# Patient Record
(demographics unavailable — no encounter records)

---

## 2024-10-11 NOTE — PROCEDURE
[FreeTextEntry1] : PFTs revealed normal flows; FEV1 was 2.72 L, which is 118% of predicted; normal flow volume loop. PFTs were performed to evaluate for asthma   FENO was 25; a normal value being less than 25 Fractional exhaled nitric oxide (FENO) is regarded as a simple, noninvasive method for assessing eosinophilic airway inflammation. Produced by a variety of cells within the lung, nitric oxide (NO) concentrations are generally low in healthy individuals. However, high concentrations of NO appear to be involved in nonspecific host defense mechanisms and chronic inflammatory diseases such as asthma. The American Thoracic Society (ATS) therefore has recommended using FENO to aid in the diagnosis and monitoring of eosinophilic airway inflammation and asthma, and for identifying steroid responsive individuals whose chronic respiratory symptoms may be caused by airway inflammation.

## 2024-10-11 NOTE — ADDENDUM
[FreeTextEntry1] : Documented by Raymundo Carmona acting as a scribe for Dr. Aris Bills on 10/11/2024 All medical record entries made by the Scribe were at my, Dr. Aris Bills's, direction and personally dictated by me on 10/11/2024 . I have reviewed the chart and agree that the record accurately reflects my personal performance of the history, physical exam, assessment and plan. I have also personally directed, reviewed, and agree with the discharge instructions.

## 2024-10-11 NOTE — REASON FOR VISIT
[Follow-Up] : a follow-up visit [TextBox_44] : abnormal CT, asthma, allergy, GERD, + severe MIGUEL A

## 2024-10-11 NOTE — PHYSICAL EXAM
[No Acute Distress] : no acute distress [Normal Oropharynx] : normal oropharynx [II] : Mallampati Class: II [Normal Appearance] : normal appearance [No Neck Mass] : no neck mass [Normal Rate/Rhythm] : normal rate/rhythm [Normal S1, S2] : normal s1, s2 [No Murmurs] : no murmurs [No Resp Distress] : no resp distress [No Abnormalities] : no abnormalities [Benign] : benign [Normal Gait] : normal gait [No Clubbing] : no clubbing [No Cyanosis] : no cyanosis [No Edema] : no edema [FROM] : FROM [Normal Color/ Pigmentation] : normal color/ pigmentation [No Focal Deficits] : no focal deficits [Oriented x3] : oriented x3 [Normal Affect] : normal affect [TextBox_11] : irritated right nostril  [TextBox_68] : I:E ratio 1:3; mild expiratory wheeze

## 2024-10-11 NOTE — HISTORY OF PRESENT ILLNESS
[TextBox_4] : Ms. CASTILLO is a 62 year old female presenting to the office today for a follow-up pulmonary evaluation. Her chief complaint is   -she notes chest tightness/pressure due to mold in leaves (seasonal) - asthmatic Sx -she notes diarrhea -she denies constipation - uses Seed probiotic  -she notes COVID-19 5/2024 -she denies treating MIGUEL A -she notes using Trelegy daily -she denies heartburn/reflux -she notes gaining weight -she denies elevated heart rate    -she denies any headaches, nausea, emesis, fever, chills, sweats, chest pain, chest pressure, coughing, wheezing, palpitations, diarrhea, constipation, dysphagia, vertigo, arthralgias, myalgias, leg swelling, itchy eyes, itchy ears, heartburn, reflux, or sour taste in the mouth.

## 2024-10-11 NOTE — ASSESSMENT
[FreeTextEntry1] : Ms. CASTILLO is a 62 year old female with a history of hypothyroid, iron deficiency / anemia, overweight, asthma, allergies, GERD, mood disorder, abnormal CT, OSAS, osteopenia, SVT 11/202, allergies, NC w/ OSAS rx - pending rx (still) - pending, weight gain, allergy/asthma active - mild Allergic issues- s/p with active asthma s/p COVID 19 - 5/2024 - active asthma / nasal irritation  The patient's shortness of breath is multifactorial due to: -pulmonary disease  -asthma -poor breathing mechanics -overweight/out of shape -anemia -(+) cardiac disease  Problem 1: asthma (risks: allergy, GERD) - (active) 2/p COVID 19- 5/2024 -continue Trelegy 100 1 inhalation QD --> move to 200  -continue Singulair 10 mg before bed (HOLD) -continue Ventolin 2 puffs Q6H, pre-exercise -add Airsupra 2 inhalations Q6H PRN -add Xopenex 0.63 via nebulizer q6H prn  -s/p Prednisone 30 mg x 5 days, 20 mg x 5 days, 10 mg x 5 days 1/2024 -Information sheet given to the patient to be reviewed, this medication is never to be used without consulting the prescribing physician. Proper dietary restraint is necessary specifically salt containing foods, if any reaction may occur should be reported.  -Asthma is believed to be caused by inherited (genetic) and environmental factor, but its exact cause is unknown. Asthma may be triggered by allergens, lung infections, or irritants in the air. Asthma triggers are different for each person -Inhaler technique reviewed as well as oral hygiene techniques reviewed with patient. Avoidance of cold air, extremes of temperature, rescue inhaler should be used before exercise. Order of medication reviewed with patient. Recommended use of a cool mist humidifier in the bedroom.  Problem 1a: SVT- x1 11/2021 -recommended f/u evaluation with Dr. Gino Jorgensen / Dr. Turner [facility] - workup completed  Problem 1B: Acute Bronchitis- 1/2024 (resolved) -Zithromax 500 mg x 7 days  -complete blood work to include: mycoplasma, chlamydia pneumoniae, and pertussis titers  -add gabapentin 100 mg Q8H   Problem 2: Allergy / Sinus (active) -continue Olopatadine 0.2% eye drops, one drop in each eye BID -add Olopatadine 0.6% 1 sniff BID or equivalent -continue Allegra 180 qAM -add Clarinex 5 mg QHS or Xyzal 5 mg QHS -continue Flonase 1 sniff each nostril BID, PRN -s/p blood work to include: IgE level, eosinophil level, (low) vitamin D level, food IgE level, and asthma profile Environmental measures for allergies were encouraged including mattress and pillow cover, air purifier, and environmental controls.  Problem 2A: Nasal Irritation -recommend Mupirocin ointment BID  Problem 2B: Low Vitamin D -on Rx Has been associated with asthma exacerbations and increased allergic symptoms. The goal based on recent information is maintaining levels between 50-70 and low normal is 30. Recommended 50,000 units every two weeks to once a month depending on the level.  Problem 3: GERD -continue Omeprazole 40 mg before breakfast -Rule of 2s: avoid eating too much, eating too fast, eating too late, eating too spicy, eating too lousy, eating two hours before bed. -Things to avoid including overeating, spicy foods, tight clothing, eating within three hours of bed, this list is not all inclusive. -For treatment of reflux, possible options discussed including diet control, H2 blockers, PPIs, as well as coating motility agents discussed as treatment options. Timing of meals and proximity of last meal to sleep were discussed. If symptoms persist, a formal gastrointestinal evaluation is needed.  Problem 4: Anemia (McNelis) -on supplemental iron -s/p Iron infusions  Problem 5: (+) MIGUEL A (severe) - ?correct degree (EDS, GERD, poor sleep, and snoring) -recommend "Snore Tape" -consider Kim Pro -complete in lab split night study based on SVT (11/2021) - resolved SVT -complete a home sleep study; APAP study (NC) -redo home sleep study (Rx 01/26/2024) -recommended Dental Device - (Scripted) / Danoff Sleep apnea is associated with adverse clinical consequences which an affect most organ systems. Cardiovascular disease risk includes arrhythmias, atrial fibrillation, hypertension, coronary artery disease, and stroke. Metabolic disorders include diabetes type 2, non-alcoholic fatty liver disease. Mood disorder especially depression; and cognitive decline especially in the elderly. Associations with chronic reflux/Bhat's esophagus some but not all inclusive. -Reasons include arousal consistent with hypopnea; respiratory events most prominent in REM sleep or supine position; therefore sleep staging and body position are important for accurate diagnosis and estimation of AHI.  Problem 6: Abnormal CT - nodule - c/w prior inflammatory disease -s/p blood work to include: Quantiferon Gold -no need for follow up CT at the current time  CAT scans are the only radiological modality to identify abnormalities w/in the lungs with regards to nodules/masses/lymph nodes. Risks, benefits were reviewed in detail. The guidelines for abnormalities include follow up CT scans at various intervals which could range from 6 weeks to 1 year intervals. If there is a change for the worse then consideration for a biopsy will be considered if you are a candidate. Second opinion evaluation with a thoracic surgeon or an interventional radiologist could be offered.  Problem 7: Poor Mechanics of Breathing -Recommended Wigee Hof and Buteyko breathing techniques - Proper breathing techniques were reviewed with an emphasis of exhalation. Patient instructed to breath in for 1 second and out for four seconds. Patient was encouraged to not talk while walking.  Problem 8: Overweight -recommended Berberine OTC supplement for visceral fat loss  - Recommended "10-Day Detox" by Avinash Anderson for 2-3 weeks followed by probiotics -Recommend "Muniq" OTC Supplement for weight loss, energy levels, and blood sugar levels -complete Functional Medicine Evaluation with Dr. Su and Dr. Carpio -Weight loss, exercise, and diet control were discussed and are highly encouraged. Treatment options were given such as, aqua therapy, and contacting a nutritionist. Recommended to use the elliptical, stationary bike, less use of treadmill. Mindful eating was explained to the patient Obesity is associated with worsening asthma, shortness of breath, and potential for cardiac disease, diabetes, and other underlying medical conditions.  Problem 9: Cardiac (SVT) 11/2021 -recommended to follow up with a cardiologist (Jameel / Johnny - work up non Dx  Problem 10: health maintenance -recommended RSV vaccine in the Fall for anyone over the age of 60 -s/p COVID 19 vaccine x 4 -s/p influenza vaccine 2023 -recommended strep pneumonia vaccines after age 65: Prevnar-13 vaccine, followed by Pneumo vaccine 23 one year following -recommended early intervention for URIs -recommended regular osteoporosis evaluations -recommended early dermatological evaluations -recommended after the age of 50 to the age of 70, colonoscopy every 5 years  F/P in 3-4 months  She is recommended to call with any changes, questions, or concerns.

## 2025-02-04 NOTE — HISTORY OF PRESENT ILLNESS
[9] : 9 [Full time] : Work status: full time [de-identified] : 63 year old female with 2 weeks RIGHT thumb pain and stiffness.  She reports that the thumb has been clicking and locking.  The stiffness and pain are worse i the morning.  Denies trauma.  Has tried NSIADs with minimal relief Also complains of bilateral hand numbness, intermittently.  She has splints, but has not been wearing them  [] : This patient has had an injection before: no [FreeTextEntry1] : right thumb [FreeTextEntry5] : right thumb pain for 1-2 weeks w/o known PAULA. pt states locking, and clicking. localized pain at base of thumb. also states wrist swelling. radiating pain into elbow and shoulder. pt was told she has carpal tunnel in cornelia wrists. hx of right thumb dislocation in 1980s.  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

## 2025-02-04 NOTE — PHYSICAL EXAM
[Right] : right hand [A1-Pulley] : A1-pulley [1st] : 1st [] : negative Phalen's [FreeTextEntry3] : R hand:  Tender volar wrist  Good finger ROM  +Tinels  +Phalens  +Compression test  .lctsral

## 2025-02-04 NOTE — DISCUSSION/SUMMARY
[Medication Risks Reviewed] : Medication risks reviewed [de-identified] : Patient wished to receive injection.

## 2025-02-07 NOTE — HISTORY OF PRESENT ILLNESS
[TextBox_4] : Ms. CASTILLO is a 63-year-old female presenting to the office today for a follow-up pulmonary evaluation. Her chief complaint is   -she notes she has a humidifier at home and at work. She no longer wakes up with sicca or with her mouth open -she notes fatigue, which she attributes to being overworked and financial stress -she notes she has difficulty waking up in the morning on workdays -she denies heartburn/reflux -she notes mild sinus congestion  -she notes dysphonia started 2 days ago. Her voice worsens as the day progresses -she notes bowels are regular on the Seed Pre - and Probiotic -she notes falling asleep around 11:30pm and waking up at 6am -she notes her breathing is stable, other than when she goes outside in extremely cold weathers -she notes weight is stable  -she denies exercising  -she notes she has carpal tunnel in both wrists (Dr. Vega) -she notes wearing wrist braces at night  -she denies any headaches, nausea, emesis, fever, chills, sweats, chest pain, chest pressure, coughing, wheezing, palpitations, diarrhea, constipation, dysphagia, vertigo, myalgias, leg swelling, itchy eyes, itchy ears, or sour taste in the mouth.

## 2025-02-07 NOTE — ADDENDUM
[FreeTextEntry1] : Documented by Jessie Silva acting as a scribe for Dr. Aris Bills on 02/07/2025. All medical record entries made by the Scribe were at my, Dr. Aris Bills's, direction and personally dictated by me on 02/07/2025. I have reviewed the chart and agree that the record accurately reflects my personal performance of the history, physical exam, assessment and plan. I have also personally directed, reviewed, and agree with the discharge instructions.

## 2025-02-07 NOTE — PROCEDURE
[FreeTextEntry1] : PFTs revealed normal flows; FEV1 was 2.49L, which is 108.3% of predicted; normal flow volume loop. PFTs were performed to evaluate for asthma  FENO was 20; a normal value being less than 25 Fractional exhaled nitric oxide (FENO) is regarded as a simple, noninvasive method for assessing eosinophilic airway inflammation. Produced by a variety of cells within the lung, nitric oxide (NO) concentrations are generally low in healthy individuals. However, high concentrations of NO appear to be involved in nonspecific host defense mechanisms and chronic inflammatory diseases such as asthma. The American Thoracic Society (ATS) therefore has recommended using FENO to aid in the diagnosis and monitoring of eosinophilic airway inflammation and asthma, and for identifying steroid responsive individuals whose chronic respiratory symptoms may be caused by airway inflammation.

## 2025-02-07 NOTE — ASSESSMENT
[FreeTextEntry1] : Ms. CASTILLO is a 63-year-old female with a history of hypothyroid, iron deficiency / anemia, overweight, asthma, allergies, GERD, mood disorder, abnormal CT, OSAS, osteopenia, SVT 11/202, allergies, NC w/ OSAS Rx - pending Rx (still) - pending, weight gain, allergy/asthma active - mild Allergic issues- s/p with active asthma s/p COVID 19 - 5/2024 - active asthma / nasal irritation- stable except morning fatigue  The patient's shortness of breath is multifactorial due to: -pulmonary disease  -asthma -poor breathing mechanics -overweight/out of shape -anemia -(+) cardiac disease  Problem 1: asthma (risks: allergy, GERD) - (controlled) (s/p COVID 19 5/2024) -continue Trelegy 100 1 inhalation QD --> move to 200  -continue Singulair 10 mg before bed (HOLD) -continue Ventolin 2 puffs Q6H, pre-exercise -continue Airsupra 2 inhalations Q6H PRN -continue Xopenex 0.63 via nebulizer q6H prn  -s/p Prednisone 30 mg x 5 days, 20 mg x 5 days, 10 mg x 5 days 1/2024  -Information sheet given to the patient to be reviewed, this medication is never to be used without consulting the prescribing physician. Proper dietary restraint is necessary specifically salt containing foods, if any reaction may occur should be reported.  -Asthma is believed to be caused by inherited (genetic) and environmental factor, but its exact cause is unknown. Asthma may be triggered by allergens, lung infections, or irritants in the air. Asthma triggers are different for each person -Inhaler technique reviewed as well as oral hygiene techniques reviewed with patient. Avoidance of cold air, extremes of temperature, rescue inhaler should be used before exercise. Order of medication reviewed with patient. Recommended use of a cool mist humidifier in the bedroom.  Problem 1A: SVT- x1 11/2021 -recommended f/p evaluation with Dr. Gino Jorgensen / Dr. Turner [facility] - workup completed  Problem 1B: Acute Bronchitis- 1/2024 (resolved) -Zithromax 500 mg x 7 days  -complete blood work: mycoplasma, chlamydia pneumoniae, and pertussis titers  -gabapentin 100 mg Q8H   Problem 2: Allergy / Sinus (active) -continue Olopatadine 0.2% eye drops, one drop in each eye BID -continue Olopatadine 0.6% 1 sniff BID or equivalent -recommended Simply Saline -continue Allegra 180 qAM -continue Clarinex 5 mg QHS or Xyzal 5 mg QHS -continue Flonase 1 sniff each nostril BID, PRN -s/p blood work to include: IgE level, eosinophil level, (low) vitamin D level, food IgE level, and asthma profile Environmental measures for allergies were encouraged including mattress and pillow cover, air purifier, and environmental controls.  Problem 2A: Nasal Irritation -recommend Mupirocin ointment BID  Problem 2B: Low Vitamin D -on Rx Has been associated with asthma exacerbations and increased allergic symptoms. The goal based on recent information is maintaining levels between 50-70 and low normal is 30. Recommended 50,000 units every two weeks to once a month depending on the level.  Problem 3: GERD -continue Omeprazole 40 mg before breakfast -Rule of 2s: avoid eating too much, eating too fast, eating too late, eating too spicy, eating too lousy, eating two hours before bed. -Things to avoid including overeating, spicy foods, tight clothing, eating within three hours of bed, this list is not all inclusive. -For treatment of reflux, possible options discussed including diet control, H2 blockers, PPIs, as well as coating motility agents discussed as treatment options. Timing of meals and proximity of last meal to sleep were discussed. If symptoms persist, a formal gastrointestinal evaluation is needed.  Problem 4: Anemia (McNelis) -on supplemental iron -s/p Iron infusions  Problem 5: (+) MIGUEL A (severe) - ?correct degree (EDS, GERD, poor sleep, and snoring) -recommend "Snore Tape" -consider [place] Pro -complete in lab split night study based on SVT (11/2021) - resolved SVT -complete a home sleep study; APAP study ([place]) -redo home sleep study (Rx 01/26/2024) -recommended Dental Device - (Scripted) / Danoff -recommended CoQ10 200mg QAM and NAD 1,000 mg QAM Sleep apnea is associated with adverse clinical consequences which an affect most organ systems. Cardiovascular disease risk includes arrhythmias, atrial fibrillation, hypertension, coronary artery disease, and stroke. Metabolic disorders include diabetes type 2, non-alcoholic fatty liver disease. Mood disorder especially depression; and cognitive decline especially in the elderly. Associations with chronic reflux/Bhat's esophagus some but not all inclusive. -Reasons include arousal consistent with hypopnea; respiratory events most prominent in REM sleep or supine position; therefore sleep staging and body position are important for accurate diagnosis and estimation of AHI.  Problem 6: Abnormal CT - nodule - c/w prior inflammatory disease -s/p blood work to include: Quantiferon Gold -no need for follow up CT at the current time  CAT scans are the only radiological modality to identify abnormalities w/in the lungs with regards to nodules/masses/lymph nodes. Risks, benefits were reviewed in detail. The guidelines for abnormalities include follow up CT scans at various intervals which could range from 6 weeks to 1 year intervals. If there is a change for the worse then consideration for a biopsy will be considered if you are a candidate. Second opinion evaluation with a thoracic surgeon or an interventional radiologist could be offered.  Problem 7: Poor Mechanics of Breathing -Recommended Светлана Dyson and Buteyko breathing techniques - Proper breathing techniques were reviewed with an emphasis of exhalation. Patient instructed to breath in for 1 second and out for four seconds. Patient was encouraged to not talk while walking.  Problem 8: Overweight -recommended Berberine OTC supplement for visceral fat loss  - Recommended "10-Day Detox" by Avinash Anderson for 2-3 weeks followed by probiotics -Recommend "Muniq" OTC Supplement for weight loss, energy levels, and blood sugar levels -complete Functional Medicine Evaluation with Dr. Su and Dr. Carpio -Weight loss, exercise, and diet control were discussed and are highly encouraged. Treatment options were given such as, aqua therapy, and contacting a nutritionist. Recommended to use the elliptical, stationary bike, less use of treadmill. Mindful eating was explained to the patient Obesity is associated with worsening asthma, shortness of breath, and potential for cardiac disease, diabetes, and other underlying medical conditions.  Problem 9: Cardiac (SVT) 11/2021 -recommended to follow up with a cardiologist Mauricio / Johnny - work up non Dx  Problem 10: health maintenance -recommended RSV vaccine in the Fall for anyone over the age of 60 -s/p COVID 19 vaccine x 4 -s/p influenza vaccine 2024 -recommended strep pneumonia vaccines after age 65: Prevnar-13 vaccine, followed by Pneumo vaccine 23 one year following -recommended early intervention for URIs -recommended regular osteoporosis evaluations -recommended early dermatological evaluations -recommended after the age of 50 to the age of 70, colonoscopy every 5 years  F/P in 4 months  She is recommended to call with any changes, questions, or concerns.

## 2025-03-04 NOTE — HISTORY OF PRESENT ILLNESS
[de-identified] : 63 year old female followed for b/l CTS & RT trigger thumb. She is 4 weeks s/p RT trigger thumb injection & has been splinting at night. She comes in reporting relief due to injection. However, she is c/o LT hand numbness, muscular cramping & pain. She reports the tingling is on the dorsal side of the hand.

## 2025-03-04 NOTE — DISCUSSION/SUMMARY
[de-identified] : Discussed the nature of the diagnosis and risk and benefits of different modalities of treatment. B/l CTS She will obtain EMG  Rx provided She will continue to splint at night  RTO after EMG

## 2025-05-30 NOTE — HISTORY OF PRESENT ILLNESS
[de-identified] : 05/30/2025 Ms. ANDREW CASTILLO, a RHD 63 year old female (currently employed, ), presents today for right knee pain that began on 05/10/25, after falling. Pt describes pain as sharp located along medial and lateral aspect of the knee associated with cracking.  Pt denies numbness/tingling into LE. C/o pain with stairs, getting up from a chair, sitting for long periods, and walking for long periods. Pt states knee is occasionally swollen. Pt has tried ice and Aleve at home for some relief. Pt wears orthotics in shoes when walking. Hx: Floating Kneecap (~40 years ago) PMH: SVT (Supraventricular Tachycardia), slow clotting

## 2025-05-30 NOTE — DISCUSSION/SUMMARY
[de-identified] : 63F with possible right knee MMT vs. contusion s/p trip and fall 5/10/25 The patient was advised of the diagnosis. The natural history of the pathology was explained in full to the patient in layman's terms. All questions were answered.  1) CSI R knee today, tolerated well 2) cryotherapy  3) activity modifications 4) wbat, hep   5) meloxicam 15mg Daily, rx provided - r/b/a and gi precautions reviewed 6) f/u 3-4 weeks   Entered by Ivette Reilly acting as scribe. Dr. Bonilla- The documentation recorded by the scribe accurately reflects the service I personally performed and the decisions made by me.

## 2025-05-30 NOTE — PHYSICAL EXAM
[Right] : right knee [Equivocal] : equivocal Gianna [5___] : hamstring 5[unfilled]/5 [] : no erythema [[id number]] : flexion 130 degrees [de-identified] : extension 0 degrees